# Patient Record
Sex: MALE | Race: WHITE
[De-identification: names, ages, dates, MRNs, and addresses within clinical notes are randomized per-mention and may not be internally consistent; named-entity substitution may affect disease eponyms.]

---

## 2018-09-21 ENCOUNTER — HOSPITAL ENCOUNTER (OUTPATIENT)
Dept: HOSPITAL 95 - ORSCSDS | Age: 78
Discharge: HOME | End: 2018-09-21
Attending: INTERNAL MEDICINE
Payer: COMMERCIAL

## 2018-09-21 VITALS — BODY MASS INDEX: 25.62 KG/M2 | WEIGHT: 189.13 LBS | HEIGHT: 72.01 IN

## 2018-09-21 DIAGNOSIS — D12.2: ICD-10-CM

## 2018-09-21 DIAGNOSIS — Z12.11: Primary | ICD-10-CM

## 2018-09-21 DIAGNOSIS — D12.5: ICD-10-CM

## 2018-09-21 DIAGNOSIS — I10: ICD-10-CM

## 2018-09-21 DIAGNOSIS — K57.30: ICD-10-CM

## 2018-09-21 DIAGNOSIS — Z79.82: ICD-10-CM

## 2018-09-21 DIAGNOSIS — Z79.899: ICD-10-CM

## 2018-09-21 DIAGNOSIS — E78.5: ICD-10-CM

## 2018-09-21 DIAGNOSIS — Z86.010: ICD-10-CM

## 2018-09-21 PROCEDURE — 0DBK8ZX EXCISION OF ASCENDING COLON, VIA NATURAL OR ARTIFICIAL OPENING ENDOSCOPIC, DIAGNOSTIC: ICD-10-PCS | Performed by: INTERNAL MEDICINE

## 2018-09-21 PROCEDURE — 0DBN8ZX EXCISION OF SIGMOID COLON, VIA NATURAL OR ARTIFICIAL OPENING ENDOSCOPIC, DIAGNOSTIC: ICD-10-PCS | Performed by: INTERNAL MEDICINE

## 2021-03-03 NOTE — NUR
Spiritual care note:
 
Brayden had asked for a  to visit.  When I got to room, he denied the
request.  He was very talkative, moving quickly from topic to topic.  He
admits that he wishes what he tried yesterday "had worked" and says he feels
like "an idiot" for not taking more medication.  Jayy spoke about his
brother-in-law's suicide.  MIKE did this in Jayy's garage "a few months ago."
Jayy's wife found him.  Jayy appeared to enjoy companionship/conversation.  He
is not Alevism "at all."  Later, I met with his wife, Yareli, at bedside.
Jayy was sleepy and even more confused.  She wants to know what POC is going
forward.  She is willing to consider placement "If Jayy agrees to it."  I will
remain available.

## 2021-03-03 NOTE — NUR
LATE AM ENTRY.   PT IS EASILY AROUSED AND VERBAL.  SOME MILD TO MOD. MENTAL
INCONSISTANCES BUT IS ALERT AND TALKITIVE, SEE CONTRIBUTING HX.  PT ABLE TO
VOID PER URINAL BUT IS INCONT. AT TIMES.  PT ABLE TO FEED SELF BUT IS
INCONSISTANT WITH PERSONAL CARE AND MESSY.  PT DENIES ANY CURRENT S.I. AND IS
VERBAL ABOUT WHAT HE HAS DONE.

## 2021-03-03 NOTE — NUR
PT WIFE WAS IN EARLIER TO VISIT.  PT REMAINS QUITE CHATTIE WITH STAFF.  VSS
WITH HR REMAINING IN 40-45 RANGE.  PT HAS BEEN VOIDING THIS DAY W/O DISTRESS.
PT CONT TO HAVE SITTER DR WILLIAM IN TO SEE PT SOON.

## 2021-03-03 NOTE — NUR
LATE AM NOTE.  PT INITALLY ON NIMBEX GTT AND TITRATED DOWN AND OFF PER DR ARAUJO
ORDER.  PT CHANGED TO PRECEDEX GTT AND LATER HR AND BP DROPPED REQUIRING
LEVOPHED GTT AS NOTED.  PT SEDATE AND TOLERTING LOWER DOSE PRECEDEX GTT AND
PROPOFOL AT 40 MCG.  PT NOTED TO HAVE COPIOUS SECRAETIONS OF WHITE, SL BEJARANO AND
THIN SECREATIONS.  PEEP INC TO 12 AND FIO2 REMAINS AT 75%.  WILL FOLLOW.
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
## 2021-03-03 NOTE — NUR
GLUCAGON AND SOD. BICARB GTT'S D/C.  EKG F/U 1ONE HOUR POST D/C.  WILL REPORT
FOR RECOMENDATION POST EKG.

## 2021-03-03 NOTE — NUR
Delayed entry for ethics consultation service provided on 3/2/21.
Clarification requested by the admitting NP regarding the legal propriety of
implementing aggressive measures of care for a principal who is suffering
medical decompensation caused by an act of intentional self-harm, who also
possesses a contraindicating advance direct instrument. Resolved: it is
hospital practice to temporarily suspend care planning devices in instances of
suicide, administer restorative and stabilizing treatments, and then once the
effects of the action of self-harm have been adequately resolved, and all
related clinical objectives satisfied, the advance directive is to be
fully resumed and submitted to.
 
Thank you for this consult.
 
Vahe Gipson Th.D.

## 2021-03-03 NOTE — NUR
PT WAS ADMITTED TO ICU 15 W INTENTIONAL BB OVERDOSE. PT ARRIVES AWAKE, ABLE TO
ANSWER QUESTIONS. PT HAS SOME CONFUSION, PT KNOWN TO HAVE SOME DEMENTIA, WAS
RECENTLY DX W PARKINSON'S. PT EXPRESSES SOME REGRET, STATES IT WAS "A STUPID
THING TO DO, I REALLY MESSED UP. WHY DIDN'T IT WORK?" PT ALSO HAS MOMENTS OF
RAMBLING CONVERSATION, DETAILING YESTERDAY'S EVENTS MIXED WO OTHER PERSONAL
HISTORY. PT CLEARLY EXPRESSES WHAT HE FEELS IS HIS FUTURE PROGRESSION W
PARKINSON'S AND THAT HE IS DEPRESSED. PT IS NOT ALWAYS CONSISTENT W RESPONSES
& WILL DEFER HEALTH HISTORY WHEN WIFE CAN BE PRESENT-"SHE KNOWS ALL THAT
STUFF".
 
STATUS UPDATE TO ARASELI JANE AT [facility] AFTER UPDATE TO POISON CONTROL FOR
MANAGEMENT. CONT W D5W W 150MEQ NAHCO3 AT 200CC/H, GLUCAGON 4MG/HR. PT HR HAS
RANGED 38-42 ALL NOC, SB W BBB. BP HAS BEEN STABLE. PT RHYTHM IS SAME AS
BASELINE (2016 EKG) BUT SLOWER (OF COURSE) DISCUSSED GIVING ATROPINE, BUT
SINCE BP IS STABLE, CONT TO MONITOR. RE-EVAL W POISON CONTROL THIS AM, AND
CONT TX PLAN. PT HAS BEED ABLE TO SIP CRANBERRY JUICE (PREFERENCE) WO ANY
DIFFICULTY.
 
VSS, PT CONT COOPERATIVE AND ALERT, OCCAS FORGETFUL, HAS TO BE REMINDED TO
STAY IN BED WHEN NEEDS TO VOID. SITTER FOR 1:1 OBSERVATION AT DOORWAY DIRECT
SIGHT. PT CONT HIGH RISK SUICIDE. CONSULT TODAY W DR GILLETTE.
 
REPORT TO DAYSHIFT.

## 2021-03-04 NOTE — NUR
This RN made a visit to pt today, included therapeutic listening as well as
reviewing pt's advanced directive.
He states he wishes he had not "been stupid and tried to off
myself". He states he has "a great wife" and "lots of grandkids" to look
forward to. He states he was feeling situationally low with the recent
loss of his dog and his new diagnsosis of Parkinson's.
 
 
Regarding his advanced directive; we reviewed a copy together, and he states
he has not changed his mind on his DNR code status, as well as no feeding tube
or ventilator. He is able to distinguish between this current visit as an
isolated event. Discussed this with his RN, and copy of directived placed in
chart for  to review prior to discharge.
 
He declined any information on Parkinson's group in [place] at this time.

## 2021-03-04 NOTE — NUR
Pt was assisted OOB to chair. He is talkative, and sitting up eating
breakfast.  Talking a lot about his wife, that she is angry with him, that she
calls him stupid, that they spent 30 years trying to get together and now 30
years trying to get apart.

## 2021-03-04 NOTE — NUR
Dr. Melgar was here to round on the patient earlier.  She states that she will
probably discharge the pt home today.  Pt is up, sitting in chair by the
window, took all of his EKG wires off, blood pressure cuff off and declines to
have them on again, states that the doctor told him he was going home after
the papers were signed.

## 2021-03-04 NOTE — NUR
SHIFT SUMMARY
 
PATIENT HAS SLEPT WELL TONIGHT. QRS READING ~ 0.14-0.16, QT ~ 0.52, SINUS
FRED WITH BUNDLE BRANCH BLOCK 38-45 BPM, BP STABLE. HAD AN OCASSIONAL LOW
READING, USUALLY PATIENT WAS LYING WITH CUFF ARM UP, HAD TO WAKE A FEW TIMES
TO RETAKE/ GET ACCURATE READING. NO C/O PAIN. ASSESSMENT IS CHARTED. WILL
CONTINUE TO MONITOR.

## 2021-03-04 NOTE — NUR
Spiritual care note:
 
Supportive visit provided to Don.  He clearly enjoyed conversation and
companionship.  He was confused and sometimes contradicted previous
statements.  But he appeared relaxed and happy to be returning home.  D/c this
afternoon.

## 2021-03-04 NOTE — NUR
The pt's central line was removed before discharge, and his wife arrived to
take him home.  Chaplain Emilia LEO visited with him as well.  Discharge
instructions were reveiwed with the pt and his wife at the bedside, and
questions answered.  PT was assisted with putting on his own clothes, and
seated in wheelchair. Taken out to private vehicle driven by his wife for
discharge.